# Patient Record
(demographics unavailable — no encounter records)

---

## 2024-12-12 NOTE — HISTORY OF PRESENT ILLNESS
[FreeTextEntry1] : Follow up for thyroiditis   Originally  Had episodes of palpitation, seeing a cardiology  Feels better now     7/2024 TFT all normal  TRAB normal CBC and CMP reassuring TSI negative   All findings suggestive of thyroiditis which is transient, we can repeat TFT before next visit to ensure stability. Orders are in

## 2024-12-12 NOTE — ASSESSMENT
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  RTC 6 months for 30 min

## 2025-04-01 NOTE — DISCUSSION/SUMMARY
[FreeTextEntry1] : disc ashwaganda, ltheanine, radha,  for anxiety,,  Discussed with the pt the importance of exercise weight bearing,yoga, aerobics good variety,  calcium totalling  mg between food and vitamins also vitamin D 2000iu daily, fish oil.  ALso that any drop of blood after menopause is not good. Encouraged SBE FU in one year.   Discussed vaginal lubricants OTC like luvena moisturizers,ky ovule and relplense . There are also vaginal estrogens, and coconut oil with intercourse. discussed colonoscopy and derma

## 2025-04-01 NOTE — HISTORY OF PRESENT ILLNESS
[FreeTextEntry1] : 61 yo here for av, she gets hot flashes at night, no  bleeding, +vag dryness, no leaking some anxiety ,  [ColonoscopyDate] : cologaurd [Currently Active] : currently active

## 2025-05-22 NOTE — HISTORY OF PRESENT ILLNESS
[FreeTextEntry1] : CPE [de-identified] : 63 y/o female with PMHx significant for abnormal TFT with stable thyroid nodules, HLD, palpitations, resenting to the office for CPE.

## 2025-05-22 NOTE — COUNSELING
[Fall prevention counseling provided] : Fall prevention counseling provided [Adequate lighting] : Adequate lighting [No throw rugs] : No throw rugs [Use proper foot wear] : Use proper foot wear [Use recommended devices] : Use recommended devices [Behavioral health counseling provided] : Behavioral health counseling provided [Sleep ___ hours/day] : Sleep [unfilled] hours/day [Engage in a relaxing activity] : Engage in a relaxing activity [Plan in advance] : Plan in advance [AUDIT-C Screening administered and reviewed] : AUDIT-C Screening administered and reviewed [Potential consequences of obesity discussed] : Potential consequences of obesity discussed [Encouraged to increase physical activity] : Encouraged to increase physical activity [____ min/wk Activity] : [unfilled] min/wk activity [None] : None [Good understanding] : Patient has a good understanding of lifestyle changes and steps needed to achieve self management goal

## 2025-05-22 NOTE — ASSESSMENT
[Vaccines Reviewed] : Immunizations reviewed today. Please see immunization details in the vaccine log within the immunization flowsheet.  [FreeTextEntry1] : 63 y/o female with PMHx significant for abnormal TFT with stable thyroid nodules, HLD, palpitations, resenting to the office for CPE.   ENDOCRINOLOGY: Abnormal TFTs, Thyroid nodules -Followed by Sienna Parkinson -Currently not on any medications -Check TSH with reflex to T4  [facility]: HLD -Check fasting lipids -Currently on Atorvastatin 20 mg daily  ENT: Seasonal allergies -Continue Fluticasone NS  PSYCH: Anxiety / palpitations -Episodic palpitations, seen by cardiology -Started on Metoprolol, pt not taking as prescribed.

## 2025-05-22 NOTE — HEALTH RISK ASSESSMENT
[Good] : ~his/her~  mood as  good [Yes] : Yes [Monthly or less (1 pt)] : Monthly or less (1 point) [1 or 2 (0 pts)] : 1 or 2 (0 points) [Never (0 pts)] : Never (0 points) [No] : In the past 12 months have you used drugs other than those required for medical reasons? No [No falls in past year] : Patient reported no falls in the past year [Little interest or pleasure doing things] : 1) Little interest or pleasure doing things [Feeling down, depressed, or hopeless] : 2) Feeling down, depressed, or hopeless [0] : 2) Feeling down, depressed, or hopeless: Not at all (0) [PHQ-2 Positive] : PHQ-2 Positive [Never] : Never [NO] : No [HIV test declined] : HIV test declined [Hepatitis C test declined] : Hepatitis C test declined [Financial] : financial [With Family] : lives with family [# of Members in Household ___] :  household currently consist of [unfilled] member(s) [Employed] : employed [] :  [# Of Children ___] : has [unfilled] children [Sexually Active] : sexually active [Feels Safe at Home] : Feels safe at home [Fully functional (bathing, dressing, toileting, transferring, walking, feeding)] : Fully functional (bathing, dressing, toileting, transferring, walking, feeding) [Fully functional (using the telephone, shopping, preparing meals, housekeeping, doing laundry, using] : Fully functional and needs no help or supervision to perform IADLs (using the telephone, shopping, preparing meals, housekeeping, doing laundry, using transportation, managing medications and managing finances) [Smoke Detector] : smoke detector [Carbon Monoxide Detector] : carbon monoxide detector [Seat Belt] :  uses seat belt [With Patient/Caregiver] : , with patient/caregiver [Reviewed updated] : Reviewed, updated [Aggressive treatment] : aggressive treatment [Audit-CScore] : 1 [de-identified] : trying , walking [de-identified] : "good" [LIR3Wdhct] : 0 [Change in mental status noted] : No change in mental status noted [High Risk Behavior] : no high risk behavior [Reports changes in hearing] : Reports no changes in hearing [Reports changes in vision] : Reports no changes in vision [Sunscreen] : does not use sunscreen [MammogramDate] : 04/24 [PapSmearDate] : 04/25 [BoneDensityDate] : 2/23 [ColonoscopyComments] : Never. [FreeTextEntry2] : Home  [AdvancecareDate] : 05/25